# Patient Record
Sex: FEMALE | Race: WHITE | URBAN - METROPOLITAN AREA
[De-identification: names, ages, dates, MRNs, and addresses within clinical notes are randomized per-mention and may not be internally consistent; named-entity substitution may affect disease eponyms.]

---

## 2022-12-05 ENCOUNTER — OFFICE VISIT (OUTPATIENT)
Dept: URBAN - METROPOLITAN AREA CLINIC 64 | Facility: CLINIC | Age: 87
End: 2022-12-05
Payer: COMMERCIAL

## 2022-12-05 DIAGNOSIS — H25.813 COMBINED FORMS OF AGE-RELATED CATARACT, BILATERAL: ICD-10-CM

## 2022-12-05 DIAGNOSIS — H35.3132 NONEXUDATIVE MACULAR DEGENERATION, INTERMEDIATE DRY STAGE, BILATERAL: Primary | ICD-10-CM

## 2022-12-05 PROCEDURE — 99204 OFFICE O/P NEW MOD 45 MIN: CPT | Performed by: OPTOMETRIST

## 2022-12-05 PROCEDURE — 92134 CPTRZ OPH DX IMG PST SGM RTA: CPT | Performed by: OPTOMETRIST

## 2022-12-05 ASSESSMENT — KERATOMETRY
OD: 44.73
OS: 44.85

## 2022-12-05 ASSESSMENT — INTRAOCULAR PRESSURE
OS: 10
OD: 10

## 2022-12-05 NOTE — IMPRESSION/PLAN
Impression: Combined forms of age-related cataract, bilateral: H25.813. Plan: Discussed and reviewed treatment options for cataracts. Risks and benefits of surgical procedure were explained and understood. Patient elects surgical treatment. Recommend surgery OD first. Patient is candidate/interested in: monofocal, multifocal IOL,  toric lens/astigmatism correction, LenSx, ORA. Outcome of surgery limitations include: dry macular degeneration, bilateral. 

Schedule for consult with Dr. Kaur Johnson or Dr. Marissa Williamson.

## 2022-12-05 NOTE — IMPRESSION/PLAN
Impression: Nonexudative macular degeneration, intermediate dry stage, bilateral: H35.4957. Plan: OS> OD. Macular degeneration dry type, with stable vision. Patient was instructed on the use of the Amsler grid and will continue monitoring vision.  recommends taking AREDS vitamins and antioxidants as they have been shown to reduce the progression of macular degeneration. Patient was told to call office immediately with vision changes. Monitor yearly.

## 2022-12-12 ENCOUNTER — OFFICE VISIT (OUTPATIENT)
Dept: URBAN - METROPOLITAN AREA CLINIC 64 | Facility: CLINIC | Age: 87
End: 2022-12-12
Payer: COMMERCIAL

## 2022-12-12 DIAGNOSIS — H35.3132 NONEXUDATIVE MACULAR DEGENERATION, INTERMEDIATE DRY STAGE, BILATERAL: ICD-10-CM

## 2022-12-12 DIAGNOSIS — H52.223 REGULAR ASTIGMATISM, BILATERAL: ICD-10-CM

## 2022-12-12 PROCEDURE — 99204 OFFICE O/P NEW MOD 45 MIN: CPT | Performed by: OPHTHALMOLOGY

## 2022-12-12 ASSESSMENT — INTRAOCULAR PRESSURE
OS: 13
OD: 12

## 2022-12-12 ASSESSMENT — VISUAL ACUITY
OS: 20/100
OD: 20/250

## 2022-12-12 NOTE — IMPRESSION/PLAN
Impression: Regular astigmatism, bilateral: H52.223. Plan: Discussed AM with [facility] in OU, patient can elect upgrade if she wishes, not necessary due to AMD OU.

## 2022-12-12 NOTE — IMPRESSION/PLAN
Impression: Nonexudative macular degeneration, intermediate dry stage, bilateral: H35.2016. Plan: Macular degeneration, dry type, appears progressed with decreased vision. Will refer the patient for a retinal specialist consult. Okay to have consult after CE/IOL OU.

## 2022-12-12 NOTE — IMPRESSION/PLAN
Impression: Combined forms of age-related cataract, bilateral: H25.813. Plan: Cataracts account for patient's complaints. The patient demonstrates impaired visual function believed not to be correctable with a tolerable change in glasses or contact lenses and ADLs are impaired. Discussed cataracts, treatment options, and surgical risks/benefits with patient. Patient elects surgical treatment. Recommend surgery OU, OD first. Aim OD: [place]. Aim OS: -0.25. Recommend ORA. Recommend monofocal IOL, see notes. NOTES:

Atrophic AMD OS, AMD OS

* [place]  - Translation done by Daughter *

## 2022-12-13 ENCOUNTER — ADULT PHYSICAL (OUTPATIENT)
Dept: URBAN - METROPOLITAN AREA CLINIC 64 | Facility: CLINIC | Age: 87
End: 2022-12-13
Payer: COMMERCIAL

## 2022-12-13 DIAGNOSIS — Z01.818 ENCOUNTER FOR OTHER PREPROCEDURAL EXAMINATION: Primary | ICD-10-CM

## 2022-12-13 DIAGNOSIS — H25.813 COMBINED FORMS OF AGE-RELATED CATARACT, BILATERAL: ICD-10-CM

## 2022-12-13 PROCEDURE — 99203 OFFICE O/P NEW LOW 30 MIN: CPT | Performed by: PHYSICIAN ASSISTANT

## 2022-12-30 ENCOUNTER — SURGERY (OUTPATIENT)
Dept: URBAN - METROPOLITAN AREA SURGERY 42 | Facility: SURGERY | Age: 87
End: 2022-12-30
Payer: COMMERCIAL

## 2022-12-30 ENCOUNTER — POST-OPERATIVE VISIT (OUTPATIENT)
Dept: URBAN - METROPOLITAN AREA CLINIC 64 | Facility: CLINIC | Age: 87
End: 2022-12-30
Payer: COMMERCIAL

## 2022-12-30 DIAGNOSIS — H25.813 COMBINED FORMS OF AGE-RELATED CATARACT, BILATERAL: Primary | ICD-10-CM

## 2022-12-30 DIAGNOSIS — Z48.810 ENCOUNTER FOR SURGICAL AFTERCARE FOLLOWING SURGERY ON A SENSE ORGAN: Primary | ICD-10-CM

## 2022-12-30 PROCEDURE — 99024 POSTOP FOLLOW-UP VISIT: CPT | Performed by: OPHTHALMOLOGY

## 2022-12-30 PROCEDURE — 66984 XCAPSL CTRC RMVL W/O ECP: CPT | Performed by: OPHTHALMOLOGY

## 2022-12-30 RX ORDER — PREDNISOLONE ACETATE 10 MG/ML
1 % SUSPENSION/ DROPS OPHTHALMIC
Qty: 3 | Refills: 1 | Status: ACTIVE
Start: 2022-12-30

## 2022-12-30 ASSESSMENT — INTRAOCULAR PRESSURE
OD: 15
OD: 15

## 2022-12-30 NOTE — IMPRESSION/PLAN
Impression: S/P Cataract Extraction by phacoemulsification with IOL placement OD - . Encounter for surgical aftercare following surgery on a sense organ  Z48.810. Excellent post op course   Post operative instructions reviewed - Plan: Po Patches top half removed at slit lamp, second half reinserted. Pt doing well. RTC as scheduled with Dr. Ciro Azevedo in 1 week. RX PF TID OD for 4 weeks. Continue with ketorolac BID OD.

## 2023-01-03 ENCOUNTER — POST-OPERATIVE VISIT (OUTPATIENT)
Dept: URBAN - METROPOLITAN AREA CLINIC 64 | Facility: CLINIC | Age: 88
End: 2023-01-03
Payer: COMMERCIAL

## 2023-01-03 DIAGNOSIS — Z48.810 ENCOUNTER FOR SURGICAL AFTERCARE FOLLOWING SURGERY ON A SENSE ORGAN: Primary | ICD-10-CM

## 2023-01-03 PROCEDURE — 99024 POSTOP FOLLOW-UP VISIT: CPT

## 2023-01-03 ASSESSMENT — INTRAOCULAR PRESSURE
OD: 15
OS: 13

## 2023-01-03 ASSESSMENT — VISUAL ACUITY: OD: 20/150

## 2023-01-03 NOTE — IMPRESSION/PLAN
Impression: S/P Cataract Extraction by phacoemulsification with IOL placement OD - 4 Days. Encounter for surgical aftercare following surgery on a sense organ  Z48.810. Excellent post op course   Post operative instructions reviewed - Plan: Increase PF QID OD and continue taking ketorolac BID OD. Pt doing well. Okay for second eye surgery as scheduled.

## 2023-01-10 ENCOUNTER — SURGERY (OUTPATIENT)
Dept: URBAN - METROPOLITAN AREA SURGERY 42 | Facility: SURGERY | Age: 88
End: 2023-01-10
Payer: COMMERCIAL

## 2023-01-10 DIAGNOSIS — H25.812 COMBINED FORMS OF AGE-RELATED CATARACT, LEFT EYE: Primary | ICD-10-CM

## 2023-01-10 PROCEDURE — 66984 XCAPSL CTRC RMVL W/O ECP: CPT | Performed by: OPHTHALMOLOGY

## 2023-01-11 ENCOUNTER — POST-OPERATIVE VISIT (OUTPATIENT)
Dept: URBAN - METROPOLITAN AREA CLINIC 64 | Facility: CLINIC | Age: 88
End: 2023-01-11
Payer: COMMERCIAL

## 2023-01-11 DIAGNOSIS — Z96.1 PRESENCE OF INTRAOCULAR LENS: Primary | ICD-10-CM

## 2023-01-11 PROCEDURE — 99024 POSTOP FOLLOW-UP VISIT: CPT

## 2023-01-11 ASSESSMENT — INTRAOCULAR PRESSURE
OD: 9
OS: 12

## 2023-01-11 NOTE — IMPRESSION/PLAN
Impression: S/P Cataract Extraction by phacoemulsification with IOL placement OS - 1 Day. Presence of intraocular lens  Z96.1. Plan: Swelling OD, improved from previous visit. Continue pred QID OD, BID OS. Continue Ketoralac BID OS only. Some swelling OS, likely to improve. Pt doing well, RTC 1 week.

## 2023-01-18 ENCOUNTER — POST-OPERATIVE VISIT (OUTPATIENT)
Dept: URBAN - METROPOLITAN AREA CLINIC 64 | Facility: CLINIC | Age: 88
End: 2023-01-18
Payer: COMMERCIAL

## 2023-01-18 DIAGNOSIS — Z96.1 PRESENCE OF INTRAOCULAR LENS: Primary | ICD-10-CM

## 2023-01-18 PROCEDURE — 99024 POSTOP FOLLOW-UP VISIT: CPT

## 2023-01-18 ASSESSMENT — VISUAL ACUITY
OD: 20/100
OS: 20/70

## 2023-01-18 ASSESSMENT — INTRAOCULAR PRESSURE
OD: 15
OS: 19

## 2023-01-18 NOTE — IMPRESSION/PLAN
Impression: S/P Cataract Extraction by phacoemulsification with IOL placement OS - 8 Days. Presence of intraocular lens  Z96.1. Plan: Pt educated. Corneal edema continues to improve. Continue Pred QID OD, BID OS, recommend AT's QID and warm compresses to help with irritation. RTC 1 week for PO.

## 2023-01-27 ENCOUNTER — POST-OPERATIVE VISIT (OUTPATIENT)
Dept: URBAN - METROPOLITAN AREA CLINIC 64 | Facility: CLINIC | Age: 88
End: 2023-01-27
Payer: COMMERCIAL

## 2023-01-27 DIAGNOSIS — Z96.1 PRESENCE OF INTRAOCULAR LENS: Primary | ICD-10-CM

## 2023-01-27 PROCEDURE — 99024 POSTOP FOLLOW-UP VISIT: CPT

## 2023-01-27 ASSESSMENT — INTRAOCULAR PRESSURE: OS: 14

## 2023-01-27 NOTE — IMPRESSION/PLAN
Impression: S/P Cataract Extraction by phacoemulsification with IOL placement OS - 17 Days. Presence of intraocular lens  Z96.1. Plan: Pt educated. Pt is doing well. PRED OD: TID x 1 week, BID x 1 week, QD x 1 week; OS QD x 1 week, D/c Ketorolac. RTC as scheduled.

## 2023-02-10 ENCOUNTER — POST-OPERATIVE VISIT (OUTPATIENT)
Dept: URBAN - METROPOLITAN AREA CLINIC 64 | Facility: CLINIC | Age: 88
End: 2023-02-10
Payer: COMMERCIAL

## 2023-02-10 DIAGNOSIS — Z96.1 PRESENCE OF INTRAOCULAR LENS: Primary | ICD-10-CM

## 2023-02-10 PROCEDURE — 99024 POSTOP FOLLOW-UP VISIT: CPT

## 2023-02-10 ASSESSMENT — INTRAOCULAR PRESSURE
OD: 14
OS: 14

## 2023-02-10 NOTE — IMPRESSION/PLAN
Impression:  Presence of intraocular lens  Z96.1. Plan: Pt ed. Dense geographic atrophy OS, macular edema, possible wet AMD OS. Rx Pred QID OD, Ketorolac QID OD. Schedule consult with Dr. Joo Fermin.

## 2023-02-16 ENCOUNTER — OFFICE VISIT (OUTPATIENT)
Dept: URBAN - METROPOLITAN AREA CLINIC 64 | Facility: CLINIC | Age: 88
End: 2023-02-16
Payer: COMMERCIAL

## 2023-02-16 DIAGNOSIS — H35.3124 NONEXUDATIVE MACULAR DEGENERATION, ADVANCED ATROPHIC WITH SUBFOVEAL INVOLVEMENT, LEFT EYE: ICD-10-CM

## 2023-02-16 DIAGNOSIS — H35.3211 EXUDATIVE MACULAR DEGENERATION, WITH ACTIVE CHOROIDAL NEOVASCULARIZATION, RIGHT EYE: Primary | ICD-10-CM

## 2023-02-16 PROCEDURE — 67028 INJECTION EYE DRUG: CPT | Performed by: OPHTHALMOLOGY

## 2023-02-16 PROCEDURE — 92250 FUNDUS PHOTOGRAPHY W/I&R: CPT | Performed by: OPHTHALMOLOGY

## 2023-02-16 PROCEDURE — 92134 CPTRZ OPH DX IMG PST SGM RTA: CPT | Performed by: OPHTHALMOLOGY

## 2023-02-16 PROCEDURE — 99214 OFFICE O/P EST MOD 30 MIN: CPT | Performed by: OPHTHALMOLOGY

## 2023-02-16 ASSESSMENT — INTRAOCULAR PRESSURE
OS: 13
OD: 13

## 2023-02-16 NOTE — IMPRESSION/PLAN
Impression: Exudative macular degeneration, with active choroidal neovascularization, right eye: H35.7228. Plan: Advanced CNV formation, Va was 20/150 less than one week ago, recommend starting anti-VEGF therapy today and decide to treat-and-extend vs treat-as-needed based on how OCT improves and Va improves over the next few visits. ANNITA OD today, RTC 4 weeks nDFEx/OCT mac/inj OD. AREDS 2 and Amsler grid checks. **OK to stop gtts.
Impression: Nonexudative macular degeneration, advanced atrophic with subfoveal involvement, left eye: H35.3124. Plan: Limited visual potential. Observe.
5

## 2023-02-24 ENCOUNTER — OFFICE VISIT (OUTPATIENT)
Dept: URBAN - METROPOLITAN AREA CLINIC 64 | Facility: CLINIC | Age: 88
End: 2023-02-24

## 2023-02-24 DIAGNOSIS — Z96.1 PRESENCE OF INTRAOCULAR LENS: ICD-10-CM

## 2023-02-24 DIAGNOSIS — H35.3211 EXUDATIVE MACULAR DEGENERATION, WITH ACTIVE CHOROIDAL NEOVASCULARIZATION, RIGHT EYE: Primary | ICD-10-CM

## 2023-02-24 DIAGNOSIS — H52.4 PRESBYOPIA: ICD-10-CM

## 2023-02-24 DIAGNOSIS — H35.3124 NONEXUDATIVE MACULAR DEGENERATION, ADVANCED ATROPHIC WITH SUBFOVEAL INVOLVEMENT, LEFT EYE: ICD-10-CM

## 2023-02-24 PROCEDURE — 99024 POSTOP FOLLOW-UP VISIT: CPT

## 2023-02-24 ASSESSMENT — VISUAL ACUITY
OS: 20/70
OD: 20/80

## 2023-02-24 ASSESSMENT — INTRAOCULAR PRESSURE
OS: 19
OD: 16

## 2023-02-24 NOTE — IMPRESSION/PLAN
Impression: Exudative macular degeneration, with active choroidal neovascularization, right eye: H38.8044.  Plan: Continue care with Dr. Carissa Hoover

## 2023-02-24 NOTE — IMPRESSION/PLAN
Impression: Nonexudative macular degeneration, advanced atrophic with subfoveal involvement, left eye: H35.3124.  Plan: See Plan 1

## 2023-02-24 NOTE — IMPRESSION/PLAN
Impression: Presence of intraocular lens: Z96.1. Plan: Pt educated. S/p CE/IOL, lens well positioned and clear. Monitor.

## 2023-02-24 NOTE — IMPRESSION/PLAN
Impression: Presbyopia: H52.4. Plan: Patient educated. Updated SRx released to patient recommend Bifocals or DVO and NVO. Consider referral to [facility] for low vision exam.

## 2023-03-21 ENCOUNTER — PROCEDURE (OUTPATIENT)
Dept: URBAN - METROPOLITAN AREA CLINIC 64 | Facility: CLINIC | Age: 88
End: 2023-03-21
Payer: COMMERCIAL

## 2023-03-21 DIAGNOSIS — H35.3124 NONEXUDATIVE MACULAR DEGENERATION, ADVANCED ATROPHIC WITH SUBFOVEAL INVOLVEMENT, LEFT EYE: ICD-10-CM

## 2023-03-21 DIAGNOSIS — H35.3211 EXUDATIVE MACULAR DEGENERATION, WITH ACTIVE CHOROIDAL NEOVASCULARIZATION, RIGHT EYE: Primary | ICD-10-CM

## 2023-03-21 PROCEDURE — 92134 CPTRZ OPH DX IMG PST SGM RTA: CPT | Performed by: OPHTHALMOLOGY

## 2023-03-21 PROCEDURE — 67028 INJECTION EYE DRUG: CPT | Performed by: OPHTHALMOLOGY

## 2023-03-21 ASSESSMENT — INTRAOCULAR PRESSURE
OS: 19
OD: 17

## 2023-03-21 NOTE — IMPRESSION/PLAN
Impression: Exudative macular degeneration, with active choroidal neovascularization, right eye: H35.6401. Plan: Advanced CNV formation, Va remains stable at 20/150, recommend starting anti-VEGF therapy today. ANNITA OD today, RTC 5 weeks nDFEx/OCT mac/FP/AF/ANANTH OD. AREDS 2 and Amsler grid checks.

## 2023-04-24 ENCOUNTER — PROCEDURE (OUTPATIENT)
Dept: URBAN - METROPOLITAN AREA CLINIC 64 | Facility: CLINIC | Age: 88
End: 2023-04-24
Payer: COMMERCIAL

## 2023-04-24 DIAGNOSIS — H35.3211 EXUDATIVE MACULAR DEGENERATION, WITH ACTIVE CHOROIDAL NEOVASCULARIZATION, RIGHT EYE: Primary | ICD-10-CM

## 2023-04-24 DIAGNOSIS — H35.3124 NONEXUDATIVE MACULAR DEGENERATION, ADVANCED ATROPHIC WITH SUBFOVEAL INVOLVEMENT, LEFT EYE: ICD-10-CM

## 2023-04-24 PROCEDURE — 92250 FUNDUS PHOTOGRAPHY W/I&R: CPT | Performed by: OPHTHALMOLOGY

## 2023-04-24 PROCEDURE — 92014 COMPRE OPH EXAM EST PT 1/>: CPT | Performed by: OPHTHALMOLOGY

## 2023-04-24 PROCEDURE — 92134 CPTRZ OPH DX IMG PST SGM RTA: CPT | Performed by: OPHTHALMOLOGY

## 2023-04-24 ASSESSMENT — INTRAOCULAR PRESSURE
OS: 17
OD: 17

## 2023-04-24 NOTE — IMPRESSION/PLAN
Impression: Exudative macular degeneration, with active choroidal neovascularization, right eye: H35.3486. Plan: Advanced CNV formation, Va remains stable at 20/150, continue w/ anti-VEGF therapy today. ANNITA OD today, RTC 4 weeks (match SYFOVRE in [address] [place] [address]) nDFEx/OCT mac/FP/AF/ANANTH OD. AREDS 2 and Amsler grid checks.

## 2023-04-24 NOTE — IMPRESSION/PLAN
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 Plan: Better-seeing eye, broad central outer retinal atrophy w/ hypo-AF. Pt elects to proceed w/ SYFOVRE OS. Treat today w/ SYFOVRE OS and RTC 4 weeks for re-injection. Will check OCT q4 months or so.

## 2023-05-22 ENCOUNTER — OFFICE VISIT (OUTPATIENT)
Dept: URBAN - METROPOLITAN AREA CLINIC 64 | Facility: CLINIC | Age: 88
End: 2023-05-22
Payer: COMMERCIAL

## 2023-05-22 DIAGNOSIS — H35.3124 NONEXUDATIVE MACULAR DEGENERATION, ADVANCED ATROPHIC WITH SUBFOVEAL INVOLVEMENT, LEFT EYE: ICD-10-CM

## 2023-05-22 DIAGNOSIS — H35.3211 EXUDATIVE AGE-RELATED MACULAR DEGENERATION, RIGHT EYE, WITH ACTIVE CHOROIDAL NEOVASCULARIZATION: Primary | ICD-10-CM

## 2023-05-22 PROCEDURE — 92250 FUNDUS PHOTOGRAPHY W/I&R: CPT | Performed by: OPHTHALMOLOGY

## 2023-05-22 PROCEDURE — 92134 CPTRZ OPH DX IMG PST SGM RTA: CPT | Performed by: OPHTHALMOLOGY

## 2023-05-22 ASSESSMENT — INTRAOCULAR PRESSURE
OD: 16
OS: 18

## 2023-05-22 NOTE — IMPRESSION/PLAN
Impression: Exudative macular degeneration, with active choroidal neovascularization, right eye: H35.2166. Plan: Advanced CNV formation, Va remains stable at 20/150, continue w/ anti-VEGF therapy today. ANNITA OD today, RTC 6 weeks (match SYFOVRE in [facility]) nDFEx/OCT mac/ANNITA OD/SYFOVRE OS. AREDS 2 and Amsler grid checks.

## 2023-05-22 NOTE — IMPRESSION/PLAN
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 Plan: Better-seeing eye, broad central outer retinal atrophy w/ hypo-AF. Continue w/ q4 week SYFOVRE. Treat today w/ SYFOVRE OS and RTC 6 weeks for re-injection. Will treat q8 weeks going forward. Will check OCT q4 months or so.

## 2023-07-11 ENCOUNTER — OFFICE VISIT (OUTPATIENT)
Dept: URBAN - METROPOLITAN AREA CLINIC 64 | Facility: LOCATION | Age: 88
End: 2023-07-11
Payer: COMMERCIAL

## 2023-07-11 DIAGNOSIS — H35.3211 EXUDATIVE AGE-RELATED MACULAR DEGENERATION, RIGHT EYE, WITH ACTIVE CHOROIDAL NEOVASCULARIZATION: Primary | ICD-10-CM

## 2023-07-11 DIAGNOSIS — H35.3124 NONEXUDATIVE MACULAR DEGENERATION, ADVANCED ATROPHIC WITH SUBFOVEAL INVOLVEMENT, LEFT EYE: ICD-10-CM

## 2023-07-11 PROCEDURE — 92134 CPTRZ OPH DX IMG PST SGM RTA: CPT | Performed by: OPHTHALMOLOGY

## 2023-07-11 PROCEDURE — 92250 FUNDUS PHOTOGRAPHY W/I&R: CPT | Performed by: OPHTHALMOLOGY

## 2023-07-11 ASSESSMENT — INTRAOCULAR PRESSURE
OD: 16
OS: 18

## 2023-07-11 NOTE — IMPRESSION/PLAN
Impression: Exudative macular degeneration, with active choroidal neovascularization, right eye: H35.4150. Plan: Advanced CNV formation, Va remains stable at 20/150, continue w/ anti-VEGF therapy today. ANNITA OD today, RTC 8 weeks (match SYFOVRE in [facility]) nDFEx/OCT mac/ANNITA OD/SYFOVRE OS. AREDS 2 and Amsler grid checks. WILL HOLD INJECTIONS AT q8 WEEKS OU GOING FORWARD.

## 2023-07-11 NOTE — IMPRESSION/PLAN
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 Plan: Better-seeing eye, broad central outer retinal atrophy w/ hypo-AF. Continue w/ q8 week SYFOVRE OS. SYFOVRE OS today.

## 2023-10-09 ENCOUNTER — OFFICE VISIT (OUTPATIENT)
Dept: URBAN - METROPOLITAN AREA CLINIC 64 | Facility: LOCATION | Age: 88
End: 2023-10-09
Payer: COMMERCIAL

## 2023-10-09 DIAGNOSIS — H35.3124 NONEXUDATIVE MACULAR DEGENERATION, ADVANCED ATROPHIC WITH SUBFOVEAL INVOLVEMENT, LEFT EYE: ICD-10-CM

## 2023-10-09 DIAGNOSIS — H35.3211 EXUDATIVE AGE-RELATED MACULAR DEGENERATION, RIGHT EYE, WITH ACTIVE CHOROIDAL NEOVASCULARIZATION: Primary | ICD-10-CM

## 2023-10-09 PROCEDURE — 92250 FUNDUS PHOTOGRAPHY W/I&R: CPT | Performed by: OPHTHALMOLOGY

## 2023-10-09 PROCEDURE — 92134 CPTRZ OPH DX IMG PST SGM RTA: CPT | Performed by: OPHTHALMOLOGY

## 2023-10-09 PROCEDURE — 92014 COMPRE OPH EXAM EST PT 1/>: CPT | Performed by: OPHTHALMOLOGY

## 2023-10-09 ASSESSMENT — INTRAOCULAR PRESSURE
OD: 11
OS: 12

## 2023-11-06 ENCOUNTER — OFFICE VISIT (OUTPATIENT)
Dept: URBAN - METROPOLITAN AREA CLINIC 64 | Facility: LOCATION | Age: 88
End: 2023-11-06
Payer: COMMERCIAL

## 2023-11-06 DIAGNOSIS — H35.3124 NONEXUDATIVE MACULAR DEGENERATION, ADVANCED ATROPHIC WITH SUBFOVEAL INVOLVEMENT, LEFT EYE: ICD-10-CM

## 2023-11-06 DIAGNOSIS — H35.3211 EXUDATIVE MACULAR DEGENERATION, WITH ACTIVE CHOROIDAL NEOVASCULARIZATION, RIGHT EYE: Primary | ICD-10-CM

## 2023-11-06 PROCEDURE — 67028 INJECTION EYE DRUG: CPT | Performed by: OPHTHALMOLOGY

## 2023-11-06 ASSESSMENT — INTRAOCULAR PRESSURE
OD: 16
OS: 17

## 2023-12-04 ENCOUNTER — OFFICE VISIT (OUTPATIENT)
Dept: URBAN - METROPOLITAN AREA CLINIC 64 | Facility: LOCATION | Age: 88
End: 2023-12-04
Payer: COMMERCIAL

## 2023-12-04 DIAGNOSIS — H35.3211 EXUDATIVE AGE-RELATED MACULAR DEGENERATION, RIGHT EYE, WITH ACTIVE CHOROIDAL NEOVASCULARIZATION: Primary | ICD-10-CM

## 2023-12-04 PROCEDURE — 92134 CPTRZ OPH DX IMG PST SGM RTA: CPT | Performed by: OPHTHALMOLOGY

## 2023-12-04 ASSESSMENT — INTRAOCULAR PRESSURE
OS: 23
OD: 22

## 2024-01-15 ENCOUNTER — OFFICE VISIT (OUTPATIENT)
Dept: URBAN - METROPOLITAN AREA CLINIC 64 | Facility: LOCATION | Age: 89
End: 2024-01-15
Payer: COMMERCIAL

## 2024-01-15 DIAGNOSIS — H35.3124 NONEXUDATIVE MACULAR DEGENERATION, ADVANCED ATROPHIC WITH SUBFOVEAL INVOLVEMENT, LEFT EYE: ICD-10-CM

## 2024-01-15 PROCEDURE — 92134 CPTRZ OPH DX IMG PST SGM RTA: CPT | Performed by: OPHTHALMOLOGY

## 2024-01-15 ASSESSMENT — INTRAOCULAR PRESSURE
OD: 15
OS: 16

## 2024-02-26 ENCOUNTER — OFFICE VISIT (OUTPATIENT)
Dept: URBAN - METROPOLITAN AREA CLINIC 64 | Facility: LOCATION | Age: 89
End: 2024-02-26

## 2024-02-26 DIAGNOSIS — H35.3211 EXUDATIVE AGE-RELATED MACULAR DEGENERATION, RIGHT EYE, WITH ACTIVE CHOROIDAL NEOVASCULARIZATION: Primary | ICD-10-CM

## 2024-02-26 DIAGNOSIS — H35.3124 NONEXUDATIVE MACULAR DEGENERATION, ADVANCED ATROPHIC WITH SUBFOVEAL INVOLVEMENT, LEFT EYE: ICD-10-CM

## 2024-02-26 PROCEDURE — 92134 CPTRZ OPH DX IMG PST SGM RTA: CPT | Performed by: OPHTHALMOLOGY

## 2024-02-26 ASSESSMENT — INTRAOCULAR PRESSURE
OD: 14
OS: 16

## 2024-04-16 ENCOUNTER — OFFICE VISIT (OUTPATIENT)
Dept: URBAN - METROPOLITAN AREA CLINIC 64 | Facility: LOCATION | Age: 89
End: 2024-04-16
Payer: COMMERCIAL

## 2024-04-16 DIAGNOSIS — H35.3211 EXUDATIVE AGE-RELATED MACULAR DEGENERATION, RIGHT EYE, WITH ACTIVE CHOROIDAL NEOVASCULARIZATION: Primary | ICD-10-CM

## 2024-04-16 DIAGNOSIS — H35.3124 NONEXUDATIVE MACULAR DEGENERATION, ADVANCED ATROPHIC WITH SUBFOVEAL INVOLVEMENT, LEFT EYE: ICD-10-CM

## 2024-04-16 PROCEDURE — 92134 CPTRZ OPH DX IMG PST SGM RTA: CPT | Performed by: OPHTHALMOLOGY

## 2024-04-16 ASSESSMENT — INTRAOCULAR PRESSURE
OD: 21
OS: 19

## 2024-06-11 ENCOUNTER — OFFICE VISIT (OUTPATIENT)
Dept: URBAN - METROPOLITAN AREA CLINIC 64 | Facility: LOCATION | Age: 89
End: 2024-06-11
Payer: COMMERCIAL

## 2024-06-11 DIAGNOSIS — H35.3124 NONEXUDATIVE MACULAR DEGENERATION, ADVANCED ATROPHIC WITH SUBFOVEAL INVOLVEMENT, LEFT EYE: ICD-10-CM

## 2024-06-11 DIAGNOSIS — H35.3211 EXUDATIVE AGE-RELATED MACULAR DEGENERATION, RIGHT EYE, WITH ACTIVE CHOROIDAL NEOVASCULARIZATION: Primary | ICD-10-CM

## 2024-06-11 PROCEDURE — 92134 CPTRZ OPH DX IMG PST SGM RTA: CPT | Performed by: OPHTHALMOLOGY

## 2024-06-11 ASSESSMENT — INTRAOCULAR PRESSURE
OS: 17
OD: 14

## 2024-07-23 ENCOUNTER — OFFICE VISIT (OUTPATIENT)
Dept: URBAN - METROPOLITAN AREA CLINIC 64 | Facility: LOCATION | Age: 89
End: 2024-07-23
Payer: COMMERCIAL

## 2024-07-23 DIAGNOSIS — H35.3124 NONEXUDATIVE AGE-RELATED MACULAR DEGENERATION, LEFT EYE, ADVANCED ATROPHIC WITH SUBFOVEAL INVOLVEMENT: Primary | ICD-10-CM

## 2024-07-23 DIAGNOSIS — H35.3211 EXUDATIVE MACULAR DEGENERATION, WITH ACTIVE CHOROIDAL NEOVASCULARIZATION, RIGHT EYE: ICD-10-CM

## 2024-07-23 PROCEDURE — 92134 CPTRZ OPH DX IMG PST SGM RTA: CPT | Performed by: OPHTHALMOLOGY

## 2024-07-23 ASSESSMENT — INTRAOCULAR PRESSURE
OS: 22
OD: 21

## 2024-09-05 ENCOUNTER — OFFICE VISIT (OUTPATIENT)
Dept: URBAN - METROPOLITAN AREA CLINIC 64 | Facility: LOCATION | Age: 89
End: 2024-09-05
Payer: COMMERCIAL

## 2024-09-05 DIAGNOSIS — H35.3231 EXUDATIVE MACULAR DEGENERATION, WITH ACTIVE CHOROIDAL NEOVASCULARIZATION, BILATERAL: ICD-10-CM

## 2024-09-05 DIAGNOSIS — H35.3211 EXUDATIVE MACULAR DEGENERATION, WITH ACTIVE CHOROIDAL NEOVASCULARIZATION, RIGHT EYE: ICD-10-CM

## 2024-09-05 DIAGNOSIS — H35.3124 NONEXUDATIVE AGE-RELATED MACULAR DEGENERATION, LEFT EYE, ADVANCED ATROPHIC WITH SUBFOVEAL INVOLVEMENT: Primary | ICD-10-CM

## 2024-09-05 PROCEDURE — 92134 CPTRZ OPH DX IMG PST SGM RTA: CPT | Performed by: OPHTHALMOLOGY

## 2024-09-05 ASSESSMENT — INTRAOCULAR PRESSURE
OD: 14
OS: 14

## 2024-09-13 ENCOUNTER — OFFICE VISIT (OUTPATIENT)
Dept: URBAN - METROPOLITAN AREA CLINIC 64 | Facility: LOCATION | Age: 89
End: 2024-09-13
Payer: COMMERCIAL

## 2024-09-13 DIAGNOSIS — H35.3124 NONEXUDATIVE AGE-RELATED MACULAR DEGENERATION, LEFT EYE, ADVANCED ATROPHIC WITH SUBFOVEAL INVOLVEMENT: Primary | ICD-10-CM

## 2024-09-13 DIAGNOSIS — H35.3231 EXUDATIVE MACULAR DEGENERATION, WITH ACTIVE CHOROIDAL NEOVASCULARIZATION, BILATERAL: ICD-10-CM

## 2024-09-13 PROCEDURE — 67028 INJECTION EYE DRUG: CPT | Performed by: OPHTHALMOLOGY

## 2024-09-13 ASSESSMENT — INTRAOCULAR PRESSURE
OS: 26
OD: 22

## 2024-10-03 ENCOUNTER — OFFICE VISIT (OUTPATIENT)
Dept: URBAN - METROPOLITAN AREA CLINIC 64 | Facility: LOCATION | Age: 89
End: 2024-10-03
Payer: COMMERCIAL

## 2024-10-03 DIAGNOSIS — H35.3231 EXUDATIVE AGE-RELATED MACULAR DEGENERATION, BILATERAL, WITH ACTIVE CHOROIDAL NEOVASCULARIZATION: Primary | ICD-10-CM

## 2024-10-03 DIAGNOSIS — H35.3124 NONEXUDATIVE AGE-RELATED MACULAR DEGENERATION, LEFT EYE, ADVANCED ATROPHIC WITH SUBFOVEAL INVOLVEMENT: ICD-10-CM

## 2024-10-03 PROCEDURE — 92134 CPTRZ OPH DX IMG PST SGM RTA: CPT | Performed by: OPHTHALMOLOGY

## 2024-10-03 ASSESSMENT — INTRAOCULAR PRESSURE
OD: 16
OS: 21

## 2024-11-08 ENCOUNTER — OFFICE VISIT (OUTPATIENT)
Dept: URBAN - METROPOLITAN AREA CLINIC 64 | Facility: LOCATION | Age: 89
End: 2024-11-08
Payer: COMMERCIAL

## 2024-11-08 DIAGNOSIS — H35.3124 NONEXUDATIVE AGE-RELATED MACULAR DEGENERATION, LEFT EYE, ADVANCED ATROPHIC WITH SUBFOVEAL INVOLVEMENT: ICD-10-CM

## 2024-11-08 DIAGNOSIS — H35.3231 EXUDATIVE MACULAR DEGENERATION, WITH ACTIVE CHOROIDAL NEOVASCULARIZATION, BILATERAL: Primary | ICD-10-CM

## 2024-11-08 ASSESSMENT — INTRAOCULAR PRESSURE
OD: 18
OS: 20

## 2024-12-10 ENCOUNTER — OFFICE VISIT (OUTPATIENT)
Dept: URBAN - METROPOLITAN AREA CLINIC 64 | Facility: LOCATION | Age: 89
End: 2024-12-10
Payer: COMMERCIAL

## 2024-12-10 DIAGNOSIS — H35.3231 EXUDATIVE MACULAR DEGENERATION, WITH ACTIVE CHOROIDAL NEOVASCULARIZATION, BILATERAL: ICD-10-CM

## 2024-12-10 DIAGNOSIS — H35.3124 NONEXUDATIVE AGE-RELATED MACULAR DEGENERATION, LEFT EYE, ADVANCED ATROPHIC WITH SUBFOVEAL INVOLVEMENT: Primary | ICD-10-CM

## 2024-12-10 PROCEDURE — 92134 CPTRZ OPH DX IMG PST SGM RTA: CPT | Performed by: OPHTHALMOLOGY

## 2024-12-10 ASSESSMENT — INTRAOCULAR PRESSURE
OS: 19
OD: 21

## 2025-01-07 ENCOUNTER — OFFICE VISIT (OUTPATIENT)
Dept: URBAN - METROPOLITAN AREA CLINIC 64 | Facility: LOCATION | Age: OVER 89
End: 2025-01-07
Payer: COMMERCIAL

## 2025-01-07 PROCEDURE — 67028 INJECTION EYE DRUG: CPT | Performed by: OPHTHALMOLOGY

## 2025-01-07 ASSESSMENT — INTRAOCULAR PRESSURE
OS: 21
OD: 22

## 2025-01-09 ENCOUNTER — OFFICE VISIT (OUTPATIENT)
Dept: URBAN - METROPOLITAN AREA CLINIC 64 | Facility: LOCATION | Age: OVER 89
End: 2025-01-09
Payer: COMMERCIAL

## 2025-01-09 DIAGNOSIS — H35.3124 NONEXUDATIVE AGE-RELATED MACULAR DEGENERATION, LEFT EYE, ADVANCED ATROPHIC WITH SUBFOVEAL INVOLVEMENT: ICD-10-CM

## 2025-01-09 DIAGNOSIS — H35.3231 EXUDATIVE AGE-RELATED MACULAR DEGENERATION, BILATERAL, WITH ACTIVE CHOROIDAL NEOVASCULARIZATION: Primary | ICD-10-CM

## 2025-01-09 PROCEDURE — 92134 CPTRZ OPH DX IMG PST SGM RTA: CPT | Performed by: OPHTHALMOLOGY

## 2025-01-09 ASSESSMENT — INTRAOCULAR PRESSURE
OS: 19
OD: 16

## 2025-02-10 ENCOUNTER — OFFICE VISIT (OUTPATIENT)
Dept: URBAN - METROPOLITAN AREA CLINIC 64 | Facility: LOCATION | Age: OVER 89
End: 2025-02-10
Payer: COMMERCIAL

## 2025-02-10 DIAGNOSIS — H35.3231 EXUDATIVE AGE-RELATED MACULAR DEGENERATION, BILATERAL, WITH ACTIVE CHOROIDAL NEOVASCULARIZATION: Primary | ICD-10-CM

## 2025-02-10 DIAGNOSIS — H35.3124 NONEXUDATIVE AGE-RELATED MACULAR DEGENERATION, LEFT EYE, ADVANCED ATROPHIC WITH SUBFOVEAL INVOLVEMENT: ICD-10-CM

## 2025-02-10 PROCEDURE — 92134 CPTRZ OPH DX IMG PST SGM RTA: CPT | Performed by: OPHTHALMOLOGY

## 2025-02-10 ASSESSMENT — INTRAOCULAR PRESSURE
OS: 16
OD: 14

## 2025-03-04 ENCOUNTER — OFFICE VISIT (OUTPATIENT)
Dept: URBAN - METROPOLITAN AREA CLINIC 64 | Facility: LOCATION | Age: OVER 89
End: 2025-03-04
Payer: COMMERCIAL

## 2025-03-04 DIAGNOSIS — H35.3124 NONEXUDATIVE AGE-RELATED MACULAR DEGENERATION, LEFT EYE, ADVANCED ATROPHIC WITH SUBFOVEAL INVOLVEMENT: Primary | ICD-10-CM

## 2025-03-04 DIAGNOSIS — H35.3231 EXUDATIVE MACULAR DEGENERATION, WITH ACTIVE CHOROIDAL NEOVASCULARIZATION, BILATERAL: ICD-10-CM

## 2025-03-04 ASSESSMENT — INTRAOCULAR PRESSURE
OD: 19
OS: 21

## 2025-04-01 ENCOUNTER — OFFICE VISIT (OUTPATIENT)
Dept: URBAN - METROPOLITAN AREA CLINIC 64 | Facility: LOCATION | Age: OVER 89
End: 2025-04-01
Payer: COMMERCIAL

## 2025-04-01 DIAGNOSIS — H35.3231 EXUDATIVE AGE-RELATED MACULAR DEGENERATION, BILATERAL, WITH ACTIVE CHOROIDAL NEOVASCULARIZATION: Primary | ICD-10-CM

## 2025-04-01 DIAGNOSIS — H35.3124 NONEXUDATIVE AGE-RELATED MACULAR DEGENERATION, LEFT EYE, ADVANCED ATROPHIC WITH SUBFOVEAL INVOLVEMENT: ICD-10-CM

## 2025-04-01 PROCEDURE — 92134 CPTRZ OPH DX IMG PST SGM RTA: CPT | Performed by: OPHTHALMOLOGY

## 2025-04-01 ASSESSMENT — INTRAOCULAR PRESSURE
OS: 18
OD: 19

## 2025-05-19 ENCOUNTER — OFFICE VISIT (OUTPATIENT)
Dept: URBAN - METROPOLITAN AREA CLINIC 64 | Facility: LOCATION | Age: OVER 89
End: 2025-05-19
Payer: COMMERCIAL

## 2025-05-19 DIAGNOSIS — H35.3124 NONEXUDATIVE AGE-RELATED MACULAR DEGENERATION, LEFT EYE, ADVANCED ATROPHIC WITH SUBFOVEAL INVOLVEMENT: ICD-10-CM

## 2025-05-19 DIAGNOSIS — H35.3231 EXUDATIVE AGE-RELATED MACULAR DEGENERATION, BILATERAL, WITH ACTIVE CHOROIDAL NEOVASCULARIZATION: Primary | ICD-10-CM

## 2025-05-19 PROCEDURE — 92134 CPTRZ OPH DX IMG PST SGM RTA: CPT | Performed by: OPHTHALMOLOGY

## 2025-05-19 ASSESSMENT — INTRAOCULAR PRESSURE
OD: 17
OS: 18

## 2025-07-24 ENCOUNTER — OFFICE VISIT (OUTPATIENT)
Dept: URBAN - METROPOLITAN AREA CLINIC 64 | Facility: LOCATION | Age: OVER 89
End: 2025-07-24
Payer: COMMERCIAL

## 2025-07-24 DIAGNOSIS — H35.3124 NONEXUDATIVE AGE-RELATED MACULAR DEGENERATION, LEFT EYE, ADVANCED ATROPHIC WITH SUBFOVEAL INVOLVEMENT: Primary | ICD-10-CM

## 2025-07-24 DIAGNOSIS — H35.3231 EXUDATIVE MACULAR DEGENERATION, WITH ACTIVE CHOROIDAL NEOVASCULARIZATION, BILATERAL: ICD-10-CM

## 2025-07-24 PROCEDURE — 92134 CPTRZ OPH DX IMG PST SGM RTA: CPT | Performed by: OPHTHALMOLOGY

## 2025-07-24 ASSESSMENT — INTRAOCULAR PRESSURE
OD: 12
OS: 12